# Patient Record
Sex: MALE | Race: WHITE | NOT HISPANIC OR LATINO | Employment: FULL TIME | URBAN - METROPOLITAN AREA
[De-identification: names, ages, dates, MRNs, and addresses within clinical notes are randomized per-mention and may not be internally consistent; named-entity substitution may affect disease eponyms.]

---

## 2017-09-21 ENCOUNTER — HOSPITAL ENCOUNTER (EMERGENCY)
Facility: CLINIC | Age: 22
Discharge: HOME | End: 2017-09-21
Attending: FAMILY MEDICINE | Admitting: FAMILY MEDICINE
Payer: OTHER MISCELLANEOUS

## 2017-09-21 VITALS
RESPIRATION RATE: 16 BRPM | HEART RATE: 94 BPM | SYSTOLIC BLOOD PRESSURE: 125 MMHG | TEMPERATURE: 99.4 F | OXYGEN SATURATION: 100 % | WEIGHT: 137 LBS | DIASTOLIC BLOOD PRESSURE: 75 MMHG
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
## 2017-09-21 DIAGNOSIS — S68.119A FINGERTIP AMPUTATION, INITIAL ENCOUNTER: Primary | ICD-10-CM

## 2017-09-21 DIAGNOSIS — S68.123A PARTIAL TRAUMATIC METACARPOPHALANGEAL AMPUTATION OF LEFT MIDDLE FINGER, INITIAL ENCOUNTER: ICD-10-CM

## 2017-09-21 PROCEDURE — 99282 EMERGENCY DEPT VISIT SF MDM: CPT | Performed by: FAMILY MEDICINE

## 2017-09-21 PROCEDURE — 99284 EMERGENCY DEPT VISIT MOD MDM: CPT | Mod: Z6 | Performed by: FAMILY MEDICINE

## 2017-09-21 RX ORDER — OXYCODONE HYDROCHLORIDE 5 MG/1
5 TABLET ORAL EVERY 6 HOURS PRN
Qty: 10 TABLET | Refills: 0 | Status: SHIPPED | OUTPATIENT
Start: 2017-09-21 | End: 2024-10-02

## 2017-09-21 ASSESSMENT — ENCOUNTER SYMPTOMS: WOUND: 1

## 2017-09-21 NOTE — ED AVS SNAPSHOT
[facility] Emergency Department    [address] [place] [address]    Phone:  [phone]    Fax:  [phone]                                       Rolando Enrique   MRN: [id number]    Department:  [facility] Emergency Department   Date of Visit:  9/21/2017           After Visit Summary Signature Page     I have received my discharge instructions, and my questions have been answered. I have discussed any challenges I see with this plan with the nurse or doctor.    ..........................................................................................................................................  Patient/Patient Representative Signature      ..........................................................................................................................................  Patient Representative Print Name and Relationship to Patient    ..................................................               ................................................  Date                                            Time    ..........................................................................................................................................  Reviewed by Signature/Title    ...................................................              ..............................................  Date                                                            Time

## 2017-09-21 NOTE — ED AVS SNAPSHOT
[facility] Emergency Department    [address] [place] [address]    Phone:  [phone]    Fax:  [phone]                                       Rolando Enrique   MRN: [id number]    Department:  [facility] Emergency Department   Date of Visit:  9/21/2017           Patient Information     Date Of Birth          1995        Your diagnoses for this visit were:     Fingertip amputation, initial encounter- left third finger        You were seen by Ector Mccoy MD.      Follow-up Information     Follow up with [facility] Emergency Department.    Specialty:  EMERGENCY MEDICINE    Why:  If symptoms worsen    Contact information:    [address]   [place] [id number] [phone]    Additional information:    From [address]: Exit at [address] on south side of [place]. Turn right on [email or web address]. Turn left at stoplight on [address]. [facility] will be in view two blocks ahead        Discharge Instructions         Finger Tip Amputation (Open Treatment)  You have cut the tip of your finger partially or completely off. For this type of injury, it is best to allow the wound to heal on its own by growing new skin from the sides. Depending on the size of the wound, it will take from 2-6 weeks for the wound to fill in with new skin. Once healed, you should regain most feeling in the new skin. I would not be surprised if this is still bleeding 7 or 8 days from now when you change the dressing.   Home care  The following guidelines will help you care for your wound at home:    If a numbing medicine was used on your finger, it will usually wear off in 1-6 hours. Then, take any pain medicine as prescribed. If none was prescribed, you can take an over-the-counter pain reliever.    Keep the injured hand elevated during the first two days to reduce swelling and pain.    Keep the bandage clean and dry. If the dressing stays on longer than two  days, it will stick to the wound. Unless, you have a doctor appointment in two days, change the bandage as described below.    I prescribed Augmentin to prevent infection. Please take them as directed until they are gone or you are told to stop.    If the dressing sticks to the wound, loosen it by soaking the dressing under warm running water.    After removing the dressing, clean the wound with soap and water. Then apply the Quik Clot and use the Coban to wrap the wound.    Use extra gauze dressing for the first two days to protect the wound from further injury. Cover with a nonstick gauze pad or wrap with bandage gauze. After that, if your wound is small and not too painful, a large stretch bandage is okay to use.    You may shower as usual, but keep the dressing dry by using a small plastic bag over the hand, rubber-banded at the wrist. Do not soak your hand in water (no baths or swimming) until your healthcare provider says it's OK.  Follow-up care  Follow up with your healthcare provider as advised.  When to seek medical advice  Call your health care provider right away if any of these occur:    Bleeding not controlled by direct pressure    Increasing pain in the wound    Redness or swelling around the wound    Pus or fluid coming from the wound or foul odor     Fever of 100.4 F (38 C) or higher, or as directed by your healthcare provider    Thank you for choosing our Emergency Department for your care.     Sincerely,    Dr Jose Mccoy M.D.          24 Hour Appointment Hotline       To make an appointment at any [facility], call [phone] ([phone]). If you don't have a family doctor or clinic, we will help you find one. [facility] are conveniently located to serve the needs of you and your family.             Review of your medicines      START taking        Dose / Directions Last dose taken    amoxicillin-clavulanate 875-125 MG per tablet   Commonly known as:  AUGMENTIN   Dose:  1  tablet   Quantity:  14 tablet        Take 1 tablet by mouth 2 times daily for 7 days   Refills:  0        oxyCODONE 5 MG IR tablet   Commonly known as:  ROXICODONE   Dose:  5 mg   Quantity:  10 tablet        Take 1 tablet (5 mg) by mouth every 6 hours as needed for pain Do not drive for six hours after taking this medicine.   Refills:  0                Prescriptions were sent or printed at these locations (2 Prescriptions)                   [facility], [place] - [address]    [address] , [facility] [address]    Telephone:  [phone]   Fax:  [phone]   Hours:                  E-Prescribed (1 of 2)         amoxicillin-clavulanate (AUGMENTIN) 875-125 MG per tablet                 Printed at Department/Unit printer (1 of 2)         oxyCODONE (ROXICODONE) 5 MG IR tablet                Orders Needing Specimen Collection     None      Pending Results     No orders found from 9/19/2017 to 9/22/2017.            Pending Culture Results     No orders found from 9/19/2017 to 9/22/2017.            Pending Results Instructions     If you had any lab results that were not finalized at the time of your Discharge, you can call the ED Lab Result RN at [phone]. You will be contacted by this team for any positive Lab results or changes in treatment. The nurses are available 7 days a week from 10A to 6:30P.  You can leave a message 24 hours per day and they will return your call.        Thank you for choosing [facility]       Thank you for choosing [facility] for your care. Our goal is always to provide you with excellent care. Hearing back from our patients is one way we can continue to improve our services. Please take a few minutes to complete the written survey that you may receive in the mail after you visit with us. Thank you!        [employer] Information     [employer] lets you send messages to your doctor, view your test results, renew your prescriptions, schedule appointments and more. To  "sign up, go to [email or web address] . Click on \"Log in\" on the left side of the screen, which will take you to the Welcome page. Then click on \"Sign up Now\" on the right side of the page.     You will be asked to enter the access code listed below, as well as some personal information. Please follow the directions to create your username and password.     Your access code is: [email or web address]  Expires: 2017  3:52 PM     Your access code will  in 90 days. If you need help or a new code, please call your [facility] clinic or [phone].        Care EveryWhere ID     This is your Care EveryWhere ID. This could be used by other organizations to access your [facility] medical records  [id number]        Equal Access to Services     FELIX CHAVEZ : Rosey Ordaz, salvador lee, gabriella thurston, matias cruz. So [facility] [phone].    ATENCIÓN: Si habla español, tiene a mg disposición servicios gratuitos de asistencia lingüística. Ken al [phone].    We comply with applicable federal civil rights laws and [place] laws. We do not discriminate on the basis of race, color, national origin, age, disability sex, sexual orientation or gender identity.            After Visit Summary       This is your record. Keep this with you and show to your community pharmacist(s) and doctor(s) at your next visit.                  "

## 2017-09-21 NOTE — ED NOTES
Patient's hand cleaned up. Bleeding has controlled and very minimal with quick clot and coban applied.

## 2017-09-21 NOTE — ED NOTES
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

## 2022-01-25 NOTE — DISCHARGE INSTRUCTIONS
Finger Tip Amputation (Open Treatment)  You have cut the tip of your finger partially or completely off. For this type of injury, it is best to allow the wound to heal on its own by growing new skin from the sides. Depending on the size of the wound, it will take from 2-6 weeks for the wound to fill in with new skin. Once healed, you should regain most feeling in the new skin. I would not be surprised if this is still bleeding 7 or 8 days from now when you change the dressing.   Home care  The following guidelines will help you care for your wound at home:    If a numbing medicine was used on your finger, it will usually wear off in 1-6 hours. Then, take any pain medicine as prescribed. If none was prescribed, you can take an over-the-counter pain reliever.    Keep the injured hand elevated during the first two days to reduce swelling and pain.    Keep the bandage clean and dry. If the dressing stays on longer than two days, it will stick to the wound. Unless, you have a doctor appointment in two days, change the bandage as described below.    I prescribed Augmentin to prevent infection. Please take them as directed until they are gone or you are told to stop.    If the dressing sticks to the wound, loosen it by soaking the dressing under warm running water.    After removing the dressing, clean the wound with soap and water. Then apply the Quik Clot and use the Coban to wrap the wound.    Use extra gauze dressing for the first two days to protect the wound from further injury. Cover with a nonstick gauze pad or wrap with bandage gauze. After that, if your wound is small and not too painful, a large stretch bandage is okay to use.    You may shower as usual, but keep the dressing dry by using a small plastic bag over the hand, rubber-banded at the wrist. Do not soak your hand in water (no baths or swimming) until your healthcare provider says it's OK.  Follow-up care  Follow up with your healthcare provider as  PRE-OP DATA and Check List - GI:  Procedure: Colonoscopy (16926)   Diagnosis: colon cancer screening  Tentative Date: Routine (next available or patient preference)  Tentative Time: To be determined  Duration of Procedure: 45 min  Anesthesia: MAC  ASA Status:   Pre-Op Needed: yes      Do not take vitamins, herbals supplements, or fish oil for 7 days prior to procedure, including vit D.     Do not take NSAID's for 3 days prior to procedure.     Tylenol is okay to take.   advised.  When to seek medical advice  Call your health care provider right away if any of these occur:    Bleeding not controlled by direct pressure    Increasing pain in the wound    Redness or swelling around the wound    Pus or fluid coming from the wound or foul odor     Fever of 100.4 F (38 C) or higher, or as directed by your healthcare provider    Thank you for choosing our Emergency Department for your care.     Sincerely,    Dr Jose Mccoy M.D.

## 2023-09-29 ENCOUNTER — OFFICE VISIT (OUTPATIENT)
Dept: URGENT CARE | Facility: URGENT CARE | Age: 28
End: 2023-09-29
Payer: COMMERCIAL

## 2023-09-29 VITALS
TEMPERATURE: 98.2 F | HEIGHT: 68 IN | DIASTOLIC BLOOD PRESSURE: 89 MMHG | WEIGHT: 158 LBS | RESPIRATION RATE: 16 BRPM | OXYGEN SATURATION: 99 % | HEART RATE: 76 BPM | BODY MASS INDEX: 23.95 KG/M2 | SYSTOLIC BLOOD PRESSURE: 147 MMHG

## 2023-09-29 DIAGNOSIS — B02.9 HERPES ZOSTER WITHOUT COMPLICATION: Primary | ICD-10-CM

## 2023-09-29 PROCEDURE — 99203 OFFICE O/P NEW LOW 30 MIN: CPT | Performed by: FAMILY MEDICINE

## 2023-09-29 RX ORDER — VALACYCLOVIR HYDROCHLORIDE 1 G/1
1000 TABLET, FILM COATED ORAL 3 TIMES DAILY
Qty: 21 TABLET | Refills: 0 | Status: SHIPPED | OUTPATIENT
Start: 2023-09-29 | End: 2024-10-02

## 2023-09-29 NOTE — PROGRESS NOTES
"SUBJECTIVE:  Chief Complaint   Patient presents with    Urgent Care     Itchy and painful rash mid lower back that wraps around to left rib cage. Patients states the pain is achy all the time.x 4 days     Rolando Enrique is a 28 year old male who presents with a chief complaint of back pain and rash.    Noticed rash initially on back on 4 days ago.  Developed more pain the following day and then notice rash spreading wrapping around.    Tried taking ibuprofen today and did seem to help  Endorsed mild itchiness, feels more painful.    Denies any falls or trauma    Had chickenpox when young child    No past medical history on file.  Current Outpatient Medications   Medication Sig Dispense Refill    oxyCODONE (ROXICODONE) 5 MG IR tablet Take 1 tablet (5 mg) by mouth every 6 hours as needed for pain Do not drive for six hours after taking this medicine. (Patient not taking: Reported on 9/29/2023) 10 tablet 0     Social History     Tobacco Use    Smoking status: Never    Smokeless tobacco: Never   Substance Use Topics    Alcohol use: Yes     Comment: Occ        ROS:  Review of systems negative except as stated above.    EXAM:   BP (!) 147/89   Pulse 76   Temp 98.2  F (36.8  C) (Oral)   Resp 16   Ht 1.727 m (5' 8\")   Wt 71.7 kg (158 lb)   SpO2 99%   BMI 24.02 kg/m    GENERAL APPEARANCE: healthy, alert and no distress  EXTREMITIES: peripheral pulses normal  SKIN: back with clustering vesicles on redden base, slight scabbing with dermatone spread of 2 other redden patches with faint vesicles on left side.  PSYCH:alert, affect bright      ASSESSMENT/PLAN:  (B02.9) Herpes zoster without complication  (primary encounter diagnosis)  Comment: back  Plan: valACYclovir (VALTREX) 1000 mg tablet            RX Valtrex given for shingles, reviewed symptomatic treatment with tylenol, ibuprofen, capsaicin cream and benadryl.  Monitor for secondary skin infection.  Avoid individuals that has not had chickenpox.  Reviewed post " herpetic neuralgia.    Follow up with primary provider if no improvement of symptoms in 1-2 weeks    Paresh Rasmussen MD  September 29, 2023 6:02 PM

## 2023-10-13 ENCOUNTER — OFFICE VISIT (OUTPATIENT)
Dept: FAMILY MEDICINE | Facility: CLINIC | Age: 28
End: 2023-10-13
Payer: COMMERCIAL

## 2023-10-13 VITALS
TEMPERATURE: 99.6 F | BODY MASS INDEX: 23.87 KG/M2 | SYSTOLIC BLOOD PRESSURE: 124 MMHG | DIASTOLIC BLOOD PRESSURE: 83 MMHG | RESPIRATION RATE: 18 BRPM | HEART RATE: 98 BPM | WEIGHT: 157 LBS | OXYGEN SATURATION: 98 %

## 2023-10-13 DIAGNOSIS — J03.90 TONSILLITIS: ICD-10-CM

## 2023-10-13 DIAGNOSIS — R07.0 THROAT PAIN: Primary | ICD-10-CM

## 2023-10-13 LAB
DEPRECATED S PYO AG THROAT QL EIA: NEGATIVE
GROUP A STREP BY PCR: NOT DETECTED

## 2023-10-13 PROCEDURE — 99213 OFFICE O/P EST LOW 20 MIN: CPT | Performed by: PHYSICIAN ASSISTANT

## 2023-10-13 PROCEDURE — 87651 STREP A DNA AMP PROBE: CPT | Performed by: PHYSICIAN ASSISTANT

## 2023-10-13 RX ORDER — PENICILLIN V POTASSIUM 500 MG/1
500 TABLET, FILM COATED ORAL 2 TIMES DAILY
Qty: 20 TABLET | Refills: 0 | Status: SHIPPED | OUTPATIENT
Start: 2023-10-13 | End: 2023-10-23

## 2023-10-13 NOTE — PROGRESS NOTES
Assessment & Plan     Throat pain    - Streptococcus A Rapid Screen w/Reflex to PCR - Clinic Collect  - Group A Streptococcus PCR Throat Swab    Tonsillitis  Pt was seen for fever and ST, exam consistent with tonsillitis.    His RST was negative.  Given sx and lack of uri sx will treat emperically awaiting PCR test.  No current signs of abscess. Given pcn.   Push fluids, rest and ibuprofen or tylenol for comfort.    RTC for persistent or worsening sx.   At the end of the encounter, I discussed results, diagnosis, medications. Discussed red flags for immediate return to clinic/ER, as well as indications for follow up if no improvement. Patient understood and agreed to plan. Patient was stable for discharge      - penicillin V (VEETID) 500 MG tablet  Dispense: 20 tablet; Refill: 0         Mala Campbell PA-C  [facility] ISACC Marshall is a 28 year old male who presents to clinic today for the following health issues:  Chief Complaint   Patient presents with    Pharyngitis     Since Sunday sore throat,fever,chills and eyes redness and itchy      HPI    Fever to 103 starting 7 days ago followed by ST.  No rhinorrhea, congestion, cough.  No nausea, vomiting.    Some HA.    Woke with red eyes yesterday, today eyes a bit better.   No sob, difficulty breathing or chest pain.    Able to get fluids in but solids more uncomfortable    Covid 19 negative on day 2.    Daughter with uri, negative for strep and covid 19.    Last fever was last night, 102.            Review of Systems        Objective    /83 (BP Location: Right arm, Patient Position: Sitting, Cuff Size: Adult Regular)   Pulse 98   Temp 99.6  F (37.6  C) (Oral)   Resp 18   Wt 71.2 kg (157 lb)   SpO2 98%   BMI 23.87 kg/m    Physical Exam     Pt is in no acute distress and appears well  Ears patent B:  TM s intact, non-injected. All land marks easily visibile    Nasal mucosa is non-edematous, no discharge.    Pharynx:  erythematous, tonsils 2+ hypertrophied, with exudate   Neck supple: tender anterior cervical adenopathy  Lungs: CTA   Heart: RRR, no murmur, no thrills or heaves   Ext: no edema  Skin: no rashes    Results for orders placed or performed in visit on 10/13/23   Streptococcus A Rapid Screen w/Reflex to PCR - Clinic Collect     Status: Normal    Specimen: Throat; Swab   Result Value Ref Range    Group A Strep antigen Negative Negative

## 2023-12-16 ENCOUNTER — HEALTH MAINTENANCE LETTER (OUTPATIENT)
Age: 28
End: 2023-12-16

## 2024-09-25 ENCOUNTER — NURSE TRIAGE (OUTPATIENT)
Dept: FAMILY MEDICINE | Facility: OTHER | Age: 29
End: 2024-09-25

## 2024-09-25 NOTE — TELEPHONE ENCOUNTER
Per appointment note:   Annual preventative visit. Long overdue. Recent concerns for chest/heart pain. 10/2

## 2024-09-25 NOTE — TELEPHONE ENCOUNTER
"Patient called back. Writer read below recommendations from provider to patient. He stated he is not currently having symptoms and was under the impression that this could wait until his appointment next week. He states he hasn't had chest pains in several days. Writer notified patient that the original RN did include in her note that he was not currently having symptoms, and that the provider still recommended ED for assessment. He stated \"ok\" but did not confirm if he is going to ED or not.     ISIDRO BarajasN, RN    "

## 2024-09-25 NOTE — TELEPHONE ENCOUNTER
RN Triage    Patient Contact    Attempt # 1    RN did attempt to reach patient . No answer. Message left for patient  to call the clinic back and ask to speak to a member of the care team. Wanting to advise him that he needs to be seen in the ED for chest pains he has been experiencing.      Emperatriz Angela RN on 9/25/2024 at 4:47 PM

## 2024-09-25 NOTE — TELEPHONE ENCOUNTER
RN Triage    Patient Contact    Attempt # 1    RN did attempt to reach patient . No answer. Message left for patient  to call the clinic back and ask to speak to a member of the care team. Wanting to triage for chest/heart pain for 10/2 appointment.      Emperatriz Angela RN on 9/25/2024 at 2:21 PM

## 2024-09-25 NOTE — TELEPHONE ENCOUNTER
S-(situation): Patient is returning call for triage on chest pain as noted below.  Patient stated he has been having intermittent left sided chest pain for approximately 1 year.  He stated it can last from 30 seconds to approximately 1/2 hour of feeling uncomfortable then going away.  He stated he can go days/ weeks without an episode.    Patient verbalized he is not experiencing any symptoms at this time.    B-(background): Grandmother had heart surgery.  Patient stated unsure of other family member history on heart disease    A-(assessment): Patient is scheduling office visit to follow up on his intermittent chest pain that he has been experiencing.    R-(recommendations): Advised patient to seek urgent/ emergency care for worsening symptoms per protocol.  Patient stated understanding.    Additional Information   Negative: Followed an injury to chest   Negative: SEVERE difficulty breathing (e.g., struggling for each breath, speaks in single words)   Negative: Difficult to awaken or acting confused (e.g., disoriented, slurred speech)   Negative: Shock suspected (e.g., cold/pale/clammy skin, too weak to stand, low BP, rapid pulse)   Negative: Passed out (i.e., lost consciousness, collapsed and was not responding)   Negative: Chest pain lasting longer than 5 minutes and ANY of the following:         Pain is crushing, pressure-like, or heavy         Over 44 years old          Over 30 years old and one cardiac risk factor (e.g diabetes, high blood pressure, high cholesterol, smoker, or family history of heart disease)         History of heart disease (e.g. angina, heart attack, heart failure, bypass surgery, takes nitroglycerin)   Negative: Heart beating < 50 beats per minute OR > 140 beats per minute   Negative: Visible sweat on face or sweat dripping down face   Negative: Sounds like a life-threatening emergency to the triager   Negative: SEVERE chest pain   Negative: Pain also in shoulder(s) or arm(s) or jaw    Negative: Difficulty breathing   Negative: Cocaine use within last 3 days   Negative: Major surgery in the past month   Negative: Hip or leg fracture (broken bone) in past month (or had cast on leg or ankle in past month)   Negative: Illness requiring prolonged bedrest in past month (e.g., immobilization, long hospital stay)   Negative: Long-distance travel in past month (e.g., car, bus, train, plane; with trip lasting 6 or more hours)   Negative: History of prior 'blood clot' in leg or lungs (i.e., deep vein thrombosis, pulmonary embolism)   Negative: History of inherited increased risk of blood clots (e.g., Factor 5 Leiden, Anti-thrombin 3, Protein C or Protein S deficiency, Prothrombin mutation)   Negative: Cancer treatment in the past two months (or has cancer now)   Negative: Heart beating irregularly or very rapidly   Negative: Chest pain lasting longer than 5 minutes and occurred in last 3 days (72 hours) (Exception: Feels exactly the same as previously diagnosed heartburn and has accompanying sour taste in mouth.)   Negative: Chest pain or 'angina' comes and goes and is happening more often (increasing in frequency) or getting worse (increasing in severity) (Exception: Chest pains that last only a few seconds.)   Negative: Dizziness or lightheadedness   Negative: Coughing up blood   Negative: Patient sounds very sick or weak to the triager   Negative: Patient says chest pain feels exactly the same as previously diagnosed 'heartburn' and describes burning in chest and accompanying sour taste in mouth   Negative: Fever > 100.4 F (38.0 C)   Negative: Chest pain(s) lasting a few seconds persists > 3 days   Negative: Rash in same area as pain (may be described as 'small blisters')   Negative: All other patients with chest pain (Exception: Fleeting chest pain lasting a few seconds.)   Negative: Patient wants to be seen   Negative: Chest pain(s) lasting a few seconds from coughing   Negative: Chest pain(s) lasting  "a few seconds    Answer Assessment - Initial Assessment Questions  1. LOCATION: \"Where does it hurt?\"        No chest pain at this time.  When hurts left pectoral/ center chest area    2. RADIATION: \"Does the pain go anywhere else?\" (e.g., into neck, jaw, arms, back)      No    3. ONSET: \"When did the chest pain begin?\" (Minutes, hours or days)       Intermittently for the past year    4. PATTERN: \"Does the pain come and go, or has it been constant since it started?\"  \"Does it get worse with exertion?\"       Occasionally when resting, feels tightness, dull/ and sharp pain in that area usually constant    5. DURATION: \"How long does it last\" (e.g., seconds, minutes, hours)      Anywhere from a couple minutes to 1/2 hour    6. SEVERITY: \"How bad is the pain?\"  (e.g., Scale 1-10; mild, moderate, or severe)     - MILD (1-3): doesn't interfere with normal activities      - MODERATE (4-7): interferes with normal activities or awakens from sleep     - SEVERE (8-10): excruciating pain, unable to do any normal activities        Mild to moderate    7. 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 PULMONARY RISK FACTORS: \"Do you have any history of lung disease?\"  (e.g., blood clots in lung, asthma, emphysema, birth control pills)      No    9. CAUSE: \"What do you think is causing the chest pain?\"      Maybe stress/ anxiety induced    10. OTHER SYMPTOMS: \"Do you have any other symptoms?\" (e.g., dizziness, nausea, vomiting, sweating, fever, difficulty breathing, cough)        No    11. PREGNANCY: \"Is there any chance you are pregnant?\" \"When was your last menstrual period?\"        NA    Protocols used: Chest Pain-A-[place]  Rhea Ozuna RN    "

## 2024-10-01 SDOH — HEALTH STABILITY: PHYSICAL HEALTH: ON AVERAGE, HOW MANY DAYS PER WEEK DO YOU ENGAGE IN MODERATE TO STRENUOUS EXERCISE (LIKE A BRISK WALK)?: 2 DAYS

## 2024-10-01 SDOH — HEALTH STABILITY: PHYSICAL HEALTH: ON AVERAGE, HOW MANY MINUTES DO YOU ENGAGE IN EXERCISE AT THIS LEVEL?: 30 MIN

## 2024-10-01 ASSESSMENT — SOCIAL DETERMINANTS OF HEALTH (SDOH): HOW OFTEN DO YOU GET TOGETHER WITH FRIENDS OR RELATIVES?: ONCE A WEEK

## 2024-10-02 ENCOUNTER — OFFICE VISIT (OUTPATIENT)
Dept: FAMILY MEDICINE | Facility: OTHER | Age: 29
End: 2024-10-02
Payer: COMMERCIAL

## 2024-10-02 VITALS
OXYGEN SATURATION: 99 % | BODY MASS INDEX: 21.82 KG/M2 | RESPIRATION RATE: 16 BRPM | WEIGHT: 144 LBS | HEART RATE: 98 BPM | TEMPERATURE: 97 F | HEIGHT: 68 IN | DIASTOLIC BLOOD PRESSURE: 80 MMHG | SYSTOLIC BLOOD PRESSURE: 110 MMHG

## 2024-10-02 DIAGNOSIS — Z11.4 SCREENING FOR HIV (HUMAN IMMUNODEFICIENCY VIRUS): ICD-10-CM

## 2024-10-02 DIAGNOSIS — R07.89 ATYPICAL CHEST PAIN: ICD-10-CM

## 2024-10-02 DIAGNOSIS — Z00.00 ROUTINE GENERAL MEDICAL EXAMINATION AT A HEALTH CARE FACILITY: Primary | ICD-10-CM

## 2024-10-02 DIAGNOSIS — F41.9 ANXIETY: ICD-10-CM

## 2024-10-02 DIAGNOSIS — Z11.59 NEED FOR HEPATITIS C SCREENING TEST: ICD-10-CM

## 2024-10-02 LAB
ANION GAP SERPL CALCULATED.3IONS-SCNC: 11 MMOL/L (ref 7–15)
BUN SERPL-MCNC: 11 MG/DL (ref 6–20)
CALCIUM SERPL-MCNC: 9.7 MG/DL (ref 8.8–10.4)
CHLORIDE SERPL-SCNC: 98 MMOL/L (ref 98–107)
CHOLEST SERPL-MCNC: 195 MG/DL
CREAT SERPL-MCNC: 1.08 MG/DL (ref 0.67–1.17)
EGFRCR SERPLBLD CKD-EPI 2021: >90 ML/MIN/1.73M2
ERYTHROCYTE [DISTWIDTH] IN BLOOD BY AUTOMATED COUNT: 12.3 % (ref 10–15)
FASTING STATUS PATIENT QL REPORTED: NO
FASTING STATUS PATIENT QL REPORTED: NO
GLUCOSE SERPL-MCNC: 91 MG/DL (ref 70–99)
HCO3 SERPL-SCNC: 28 MMOL/L (ref 22–29)
HCT VFR BLD AUTO: 45.1 % (ref 40–53)
HDLC SERPL-MCNC: 71 MG/DL
HGB BLD-MCNC: 15.7 G/DL (ref 13.3–17.7)
LDLC SERPL CALC-MCNC: 87 MG/DL
MCH RBC QN AUTO: 29.5 PG (ref 26.5–33)
MCHC RBC AUTO-ENTMCNC: 34.8 G/DL (ref 31.5–36.5)
MCV RBC AUTO: 85 FL (ref 78–100)
NONHDLC SERPL-MCNC: 124 MG/DL
PLATELET # BLD AUTO: 214 10E3/UL (ref 150–450)
POTASSIUM SERPL-SCNC: 3.9 MMOL/L (ref 3.4–5.3)
RBC # BLD AUTO: 5.33 10E6/UL (ref 4.4–5.9)
SODIUM SERPL-SCNC: 137 MMOL/L (ref 135–145)
TRIGL SERPL-MCNC: 186 MG/DL
TSH SERPL DL<=0.005 MIU/L-ACNC: 1.4 UIU/ML (ref 0.3–4.2)
WBC # BLD AUTO: 7.4 10E3/UL (ref 4–11)

## 2024-10-02 PROCEDURE — 86803 HEPATITIS C AB TEST: CPT

## 2024-10-02 PROCEDURE — 84443 ASSAY THYROID STIM HORMONE: CPT

## 2024-10-02 PROCEDURE — 85027 COMPLETE CBC AUTOMATED: CPT

## 2024-10-02 PROCEDURE — 90656 IIV3 VACC NO PRSV 0.5 ML IM: CPT

## 2024-10-02 PROCEDURE — 80061 LIPID PANEL: CPT

## 2024-10-02 PROCEDURE — 91320 SARSCV2 VAC 30MCG TRS-SUC IM: CPT

## 2024-10-02 PROCEDURE — 90480 ADMN SARSCOV2 VAC 1/ONLY CMP: CPT

## 2024-10-02 PROCEDURE — 80048 BASIC METABOLIC PNL TOTAL CA: CPT

## 2024-10-02 PROCEDURE — 90471 IMMUNIZATION ADMIN: CPT

## 2024-10-02 PROCEDURE — 36415 COLL VENOUS BLD VENIPUNCTURE: CPT

## 2024-10-02 PROCEDURE — 87389 HIV-1 AG W/HIV-1&-2 AB AG IA: CPT

## 2024-10-02 PROCEDURE — 99395 PREV VISIT EST AGE 18-39: CPT | Mod: 25

## 2024-10-02 RX ORDER — HYDROXYZINE PAMOATE 25 MG/1
25 CAPSULE ORAL 3 TIMES DAILY PRN
Qty: 30 CAPSULE | Refills: 0 | Status: SHIPPED | OUTPATIENT
Start: 2024-10-02

## 2024-10-02 ASSESSMENT — PAIN SCALES - GENERAL: PAINLEVEL: NO PAIN (0)

## 2024-10-02 NOTE — PATIENT INSTRUCTIONS
Patient Education   Preventive Care Advice   This is general advice given by our system to help you stay healthy. However, your care team may have specific advice just for you. Please talk to your care team about your preventive care needs.  Nutrition  Eat 5 or more servings of fruits and vegetables each day.  Try wheat bread, brown rice and whole grain pasta (instead of white bread, rice, and pasta).  Get enough calcium and vitamin D. Check the label on foods and aim for 100% of the RDA (recommended daily allowance).  Lifestyle  Exercise at least 150 minutes each week  (30 minutes a day, 5 days a week).  Do muscle strengthening activities 2 days a week. These help control your weight and prevent disease.  No smoking.  Wear sunscreen to prevent skin cancer.  Have a dental exam and cleaning every 6 months.  Yearly exams  See your health care team every year to talk about:  Any changes in your health.  Any medicines your care team has prescribed.  Preventive care, family planning, and ways to prevent chronic diseases.  Shots (vaccines)   HPV shots (up to age 26), if you've never had them before.  Hepatitis B shots (up to age 59), if you've never had them before.  COVID-19 shot: Get this shot when it's due.  Flu shot: Get a flu shot every year.  Tetanus shot: Get a tetanus shot every 10 years.  Pneumococcal, hepatitis A, and RSV shots: Ask your care team if you need these based on your risk.  Shingles shot (for age 50 and up)  General health tests  Diabetes screening:  Starting at age 35, Get screened for diabetes at least every 3 years.  If you are younger than age 35, ask your care team if you should be screened for diabetes.  Cholesterol test: At age 39, start having a cholesterol test every 5 years, or more often if advised.  Bone density scan (DEXA): At age 50, ask your care team if you should have this scan for osteoporosis (brittle bones).  Hepatitis C: Get tested at least once in your life.  STIs (sexually  transmitted infections)  Before age 24: Ask your care team if you should be screened for STIs.  After age 24: Get screened for STIs if you're at risk. You are at risk for STIs (including HIV) if:  You are sexually active with more than one person.  You don't use condoms every time.  You or a partner was diagnosed with a sexually transmitted infection.  If you are at risk for HIV, ask about PrEP medicine to prevent HIV.  Get tested for HIV at least once in your life, whether you are at risk for HIV or not.  Cancer screening tests  Cervical cancer screening: If you have a cervix, begin getting regular cervical cancer screening tests starting at age 21.  Breast cancer scan (mammogram): If you've ever had breasts, begin having regular mammograms starting at age 40. This is a scan to check for breast cancer.  Colon cancer screening: It is important to start screening for colon cancer at age 45.  Have a colonoscopy test every 10 years (or more often if you're at risk) Or, ask your provider about stool tests like a FIT test every year or Cologuard test every 3 years.  To learn more about your testing options, visit:   .  For help making a decision, visit:   https://bit.ly/lo50895.  Prostate cancer screening test: If you have a prostate, ask your care team if a prostate cancer screening test (PSA) at age 55 is right for you.  Lung cancer screening: If you are a current or former smoker ages 50 to 80, ask your care team if ongoing lung cancer screenings are right for you.  For informational purposes only. Not to replace the advice of your health care provider. Copyright   2023 [employer]. All rights reserved. Clinically reviewed by the [facility] Transitions Program. [employer]Â® 129300 - REV 01/24.

## 2024-10-02 NOTE — PROGRESS NOTES
Preventive Care Visit  [facility]  Antonio Mayers DO, Family Practice  Oct 2, 2024      Assessment & Plan     1. Routine general medical examination at a health care facility    2. Atypical chest pain  Patient reports intermittent chest pain typically located in the midsternal area lasting from 5 up to 30 minutes.  He does attribute this to possible anxiety.  Does have some chest tightness and possible shortness of breath with these episodes.  Typically does come on when he feels increasingly overwhelmed or with upcoming deadlines.  Does have some history of anxiety depression although has not previously been medicated for this.  Will obtain CBC, BMP, TSH.  Will provide hydroxyzine as needed for anxiety symptoms. He denies any palpitations. No significant family hx of coronary issue  - Lipid panel reflex to direct LDL Fasting; Future  - CBC with platelets; Future  - Basic metabolic panel  (Ca, Cl, CO2, Creat, Gluc, K, Na, BUN); Future  - TSH with free T4 reflex; Future  - hydrOXYzine malka (VISTARIL) 25 MG capsule; Take 1 capsule (25 mg) by mouth 3 times daily as needed for anxiety.  Dispense: 30 capsule; Refill: 0    3. Anxiety  See above.  - Adult Mental Health  Referral; Future  - hydrOXYzine malka (VISTARIL) 25 MG capsule; Take 1 capsule (25 mg) by mouth 3 times daily as needed for anxiety.  Dispense: 30 capsule; Refill: 0    4. Screening for HIV (human immunodeficiency virus)  - HIV Antigen Antibody Combo; Future    5. Need for hepatitis C screening test  - Hepatitis C Screen Reflex to HCV RNA Quant and Genotype; Future      Counseling  Appropriate preventive services were addressed with this patient via screening, questionnaire, or discussion as appropriate for fall prevention, nutrition, physical activity, Tobacco-use cessation, social engagement, weight loss and cognition.  Checklist reviewing preventive services available has been given to the patient.  Reviewed patient's  diet, addressing concerns and/or questions.   He is at risk for lack of exercise and has been provided with information to increase physical activity for the benefit of his well-being.       Follow-up in 3 months for atypical chest pain, mental health.      Ella Irizarry is a 29 year old, presenting for the following:  Physical        10/2/2024     4:51 PM   Additional Questions   Roomed by Lesa STATON   Accompanied by Self        Health Care Directive  Patient does not have a Health Care Directive or Living Will: Declined currently.    HPI    Patient reports intermittent chest pain that he has experienced numerous times. Typically this last 5 mins, up to 30 mins. Typically over left-mid sternal region. Possibly some shortness of breath during those times, chest tightness. Denies syncope.        10/1/2024   General Health   How would you rate your overall physical health? Good   Feel stress (tense, anxious, or unable to sleep) To some extent      (!) STRESS CONCERN      10/1/2024   Nutrition   Three or more servings of calcium each day? (!) NO   Diet: Regular (no restrictions)   How many servings of fruit and vegetables per day? (!) 0-1   How many sweetened beverages each day? 0-1            10/1/2024   Exercise   Days per week of moderate/strenous exercise 2 days   Average minutes spent exercising at this level 30 min      (!) EXERCISE CONCERN      10/1/2024   Social Factors   Frequency of gathering with friends or relatives Once a week   Worry food won't last until get money to buy more No   Food not last or not have enough money for food? No   Do you have housing? (Housing is defined as stable permanent housing and does not include staying ouside in a car, in a tent, in an abandoned building, in an overnight shelter, or couch-surfing.) Yes   Are you worried about losing your housing? No   Lack of transportation? No   Unable to get utilities (heat,electricity)? No            10/1/2024   Dental   Dentist two times  "every year? Yes            10/1/2024   TB Screening   Were you born outside of the [place]? No            Today's PHQ-2 Score:       10/1/2024     9:40 PM   PHQ-2 ( 1999 [employer])   Q1: Little interest or pleasure in doing things 1   Q2: Feeling down, depressed or hopeless 1   PHQ-2 Score 2   Q1: Little interest or pleasure in doing things Several days   Q2: Feeling down, depressed or hopeless Several days   PHQ-2 Score 2           10/1/2024   Substance Use   Alcohol more than 3/day or more than 7/wk No   Do you use any other substances recreationally? No        Social History     Tobacco Use    Smoking status: Never    Smokeless tobacco: Never   Vaping Use    Vaping status: Never Used   Substance Use Topics    Alcohol use: Yes     Comment: Occ     Drug use: No             10/1/2024   One time HIV Screening   Previous HIV test? No          10/1/2024   STI Screening   New sexual partner(s) since last STI/HIV test? No            10/1/2024   Contraception/Family Planning   Questions about contraception or family planning No           Reviewed and updated as needed this visit by Provider   Tobacco  Allergies  Meds  Problems  Med Hx  Surg Hx  Fam Hx               Objective    Exam  /80   Pulse 98   Temp 97  F (36.1  C) (Temporal)   Resp 16   Ht 1.727 m (5' 8\")   Wt 65.3 kg (144 lb)   SpO2 99%   BMI 21.90 kg/m     Estimated body mass index is 21.9 kg/m  as calculated from the following:    Height as of this encounter: 1.727 m (5' 8\").    Weight as of this encounter: 65.3 kg (144 lb).    Physical Exam  Vitals reviewed.   Constitutional:       General: He is not in acute distress.     Appearance: Normal appearance. He is not ill-appearing.   HENT:      Head: Normocephalic and atraumatic.      Right Ear: Tympanic membrane and ear canal normal.      Left Ear: Tympanic membrane and ear canal normal.      Nose: Nose normal.      Mouth/Throat:      Mouth: Mucous membranes are moist.      Pharynx: Oropharynx is " clear. No oropharyngeal exudate or posterior oropharyngeal erythema.   Eyes:      Extraocular Movements: Extraocular movements intact.      Pupils: Pupils are equal, round, and reactive to light.   Cardiovascular:      Rate and Rhythm: Regular rhythm.      Pulses: Normal pulses.      Heart sounds: Normal heart sounds. No murmur heard.     No friction rub.   Pulmonary:      Effort: Pulmonary effort is normal. No respiratory distress.      Breath sounds: Normal breath sounds. No stridor. No wheezing.   Abdominal:      General: Abdomen is flat. There is no distension.      Palpations: Abdomen is soft.      Tenderness: There is no abdominal tenderness.   Musculoskeletal:      Cervical back: Normal range of motion and neck supple. No rigidity.      Right lower leg: No edema.      Left lower leg: No edema.   Skin:     General: Skin is warm and dry.   Neurological:      General: No focal deficit present.      Mental Status: He is alert and oriented to person, place, and time.      Gait: Gait normal.   Psychiatric:         Mood and Affect: Mood normal.         Behavior: Behavior normal.           Signed Electronically by: Antonio Mayers DO

## 2024-10-03 LAB
HCV AB SERPL QL IA: NONREACTIVE
HIV 1+2 AB+HIV1 P24 AG SERPL QL IA: NONREACTIVE

## 2025-03-07 ENCOUNTER — NURSE TRIAGE (OUTPATIENT)
Dept: FAMILY MEDICINE | Facility: OTHER | Age: 30
End: 2025-03-07
Payer: COMMERCIAL

## 2025-03-10 NOTE — TELEPHONE ENCOUNTER
RN called to discuss MyChart message. Patient notes there is an on and off pain in L side of chest where pectoral muscle is. Pain is present when he moves a certain way. He describes the pain as if he did a heavy workout and is sore. Patient notes pain is mild and is slowly improving. He has been increasing water intake and that has helped.     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     RN reviewed red flag symptoms with patient and when to see emergency care. They agreed and understood.     RN offered to schedule an appointment but patient noted symptoms were improving and he would like to continue to monitor and will call back if symptoms worsen or do not continue to improve.     MICHAEL Rey, RN       Reason for Disposition   Chest pain   Chest pain(s) lasting a few seconds    Additional Information   Negative: Shock suspected (e.g., cold/pale/clammy skin, too weak to stand, low BP, rapid pulse)   Negative: Difficult to awaken or acting confused (e.g., disoriented, slurred speech)   Negative: Sounds like a life-threatening emergency to the triager   Negative: SEVERE difficulty breathing (e.g., struggling for each breath, speaks in single words)   Negative: Difficult to awaken or acting confused (e.g., disoriented, slurred speech)   Negative: Shock suspected (e.g., cold/pale/clammy skin, too weak to stand, low BP, rapid pulse)   Negative: Passed out (i.e., lost consciousness, collapsed and was not responding)   Negative: Chest pain lasting longer than 5 minutes and ANY of the following:         Pain is crushing, pressure-like, or heavy         Over 44 years old          Over 30 years old and one cardiac risk factor (e.g diabetes, high blood pressure, high cholesterol, smoker, or family history of heart disease)         History of heart disease (e.g. angina, heart attack, heart  failure, bypass surgery, takes nitroglycerin)   Negative: Heart beating < 50 beats per minute OR > 140 beats per minute   Negative: Visible sweat on face or sweat dripping down face   Negative: Sounds like a life-threatening emergency to the triager   Negative: Followed an injury to chest   Negative: Fever > 100.4 F (38.0 C)   Negative: Chest pain(s) lasting a few seconds persists > 3 days   Negative: Rash in same area as pain (may be described as 'small blisters')   Negative: All other patients with chest pain (Exception: Fleeting chest pain lasting a few seconds.)   Negative: Patient wants to be seen   Negative: Patient says chest pain feels exactly the same as previously diagnosed 'heartburn' and describes burning in chest and accompanying sour taste in mouth   Negative: Chest pain lasting longer than 5 minutes and occurred in last 3 days (72 hours) (Exception: Feels exactly the same as previously diagnosed heartburn and has accompanying sour taste in mouth.)   Negative: Chest pain or 'angina' comes and goes and is happening more often (increasing in frequency) or getting worse (increasing in severity) (Exception: Chest pains that last only a few seconds.)   Negative: Dizziness or lightheadedness   Negative: Coughing up blood   Negative: Patient sounds very sick or weak to the triager   Negative: SEVERE chest pain   Negative: Pain also in shoulder(s) or arm(s) or jaw   Negative: Difficulty breathing   Negative: Cocaine use within last 3 days   Negative: Major surgery in the past month   Negative: Hip or leg fracture (broken bone) in past month (or had cast on leg or ankle in past month)   Negative: Illness requiring prolonged bedrest in past month (e.g., immobilization, long hospital stay)   Negative: Long-distance travel in past month (e.g., car, bus, train, plane; with trip lasting 6 or more hours)   Negative: History of prior 'blood clot' in leg or lungs (i.e., deep vein thrombosis, pulmonary embolism)    Negative: History of inherited increased risk of blood clots (e.g., Factor 5 Leiden, Anti-thrombin 3, Protein C or Protein S deficiency, Prothrombin mutation)   Negative: Cancer treatment in the past two months (or has cancer now)   Negative: Heart beating irregularly or very rapidly    Protocols used: Muscle Aches and Body Pain-A-OH, Chest Pain-A-OH

## 2025-07-10 ENCOUNTER — OFFICE VISIT (OUTPATIENT)
Dept: FAMILY MEDICINE | Facility: OTHER | Age: 30
End: 2025-07-10
Payer: COMMERCIAL

## 2025-07-10 VITALS
HEART RATE: 72 BPM | HEIGHT: 68 IN | OXYGEN SATURATION: 97 % | DIASTOLIC BLOOD PRESSURE: 68 MMHG | RESPIRATION RATE: 16 BRPM | BODY MASS INDEX: 22.58 KG/M2 | WEIGHT: 149 LBS | TEMPERATURE: 97.9 F | SYSTOLIC BLOOD PRESSURE: 98 MMHG

## 2025-07-10 DIAGNOSIS — R07.0 THROAT PAIN: ICD-10-CM

## 2025-07-10 DIAGNOSIS — J02.9 SORE THROAT: ICD-10-CM

## 2025-07-10 DIAGNOSIS — R59.0 CERVICAL LYMPHADENOPATHY: ICD-10-CM

## 2025-07-10 DIAGNOSIS — Z20.818 STREP THROAT EXPOSURE: ICD-10-CM

## 2025-07-10 DIAGNOSIS — J35.1 TONSILLAR HYPERTROPHY: ICD-10-CM

## 2025-07-10 DIAGNOSIS — J02.0 STREP THROAT: Primary | ICD-10-CM

## 2025-07-10 LAB — DEPRECATED S PYO AG THROAT QL EIA: POSITIVE

## 2025-07-10 RX ORDER — AZITHROMYCIN 250 MG/1
TABLET, FILM COATED ORAL
Qty: 6 TABLET | Refills: 0 | Status: SHIPPED | OUTPATIENT
Start: 2025-07-10

## 2025-07-10 NOTE — PROGRESS NOTES
"  Assessment & Plan     Strep throat  Strep throat exposure  Sore throat  Throat pain  Tonsillar hypertrophy  Cervical lymphadenopathy  Treat and observe  - Streptococcus A Rapid Screen w/Reflex to PCR - Clinic Collect  - azithromycin (ZITHROMAX) 250 MG tablet; Two tablets first day, then one tablet daily for four days    Ella Irizarry is a 29 year old, presenting for the following health issues:  Nasal Congestion (Family got strep going on vacation soon )      7/10/2025     4:05 PM   Additional Questions   Roomed by danielle     History of Present Illness       Reason for visit:  Strep throat symptoms  Symptom onset:  1-3 days ago  Symptom intensity:  Mild  Symptom progression:  Worsening  Had these symptoms before:  No   He is taking medications regularly.        Review of Systems  Constitutional, HEENT, cardiovascular, pulmonary, GI, , musculoskeletal, neuro, skin, endocrine and psych systems are negative, except as otherwise noted.      Objective    BP 98/68 (BP Location: Left arm, Cuff Size: Adult Regular)   Pulse 72   Temp 97.9  F (36.6  C) (Temporal)   Resp 16   Ht 1.727 m (5' 8\")   Wt 67.6 kg (149 lb)   SpO2 97%   BMI 22.66 kg/m    Body mass index is 22.66 kg/m .  Physical Exam   GENERAL: alert and no distress  HENT: normal cephalic/atraumatic, ear canals and TM's normal, nose and mouth without ulcers or lesions, oropharynx clear, oral mucous membranes moist, tonsillar hypertrophy, and tonsillar erythema  NECK: bilateral anterior cervical adenopathy and thyroid normal to palpation  RESP: lungs clear to auscultation - no rales, rhonchi or wheezes  CV: regular rate and rhythm, normal S1 S2, no S3 or S4, no murmur, click or rub, no peripheral edema  MS: no gross musculoskeletal defects noted, no edema  NEURO: Normal strength and tone, mentation intact and speech normal  PSYCH: mentation appears normal, affect normal/bright    No results found for any visits on 07/10/25.        Signed Electronically " by: Carlo Kelley PA-C

## 2025-09-03 ENCOUNTER — PATIENT OUTREACH (OUTPATIENT)
Dept: CARE COORDINATION | Facility: CLINIC | Age: 30
End: 2025-09-03
Payer: COMMERCIAL